# Patient Record
Sex: FEMALE | Race: ASIAN | NOT HISPANIC OR LATINO | URBAN - METROPOLITAN AREA
[De-identification: names, ages, dates, MRNs, and addresses within clinical notes are randomized per-mention and may not be internally consistent; named-entity substitution may affect disease eponyms.]

---

## 2023-04-01 ENCOUNTER — EMERGENCY (EMERGENCY)
Facility: HOSPITAL | Age: 28
LOS: 0 days | Discharge: HOME | End: 2023-04-02
Attending: EMERGENCY MEDICINE
Payer: OTHER MISCELLANEOUS

## 2023-04-01 VITALS
HEART RATE: 82 BPM | SYSTOLIC BLOOD PRESSURE: 120 MMHG | OXYGEN SATURATION: 100 % | TEMPERATURE: 99 F | RESPIRATION RATE: 16 BRPM | DIASTOLIC BLOOD PRESSURE: 94 MMHG
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
## 2023-04-01 DIAGNOSIS — Z77.21 CONTACT WITH AND (SUSPECTED) EXPOSURE TO POTENTIALLY HAZARDOUS BODY FLUIDS: ICD-10-CM
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
## 2023-04-01 DIAGNOSIS — Z23 ENCOUNTER FOR IMMUNIZATION: ICD-10-CM

## 2023-04-01 PROCEDURE — 85025 COMPLETE CBC W/AUTO DIFF WBC: CPT

## 2023-04-01 PROCEDURE — 86803 HEPATITIS C AB TEST: CPT

## 2023-04-01 PROCEDURE — 86706 HEP B SURFACE ANTIBODY: CPT

## 2023-04-01 PROCEDURE — 90715 TDAP VACCINE 7 YRS/> IM: CPT

## 2023-04-01 PROCEDURE — 99284 EMERGENCY DEPT VISIT MOD MDM: CPT

## 2023-04-01 PROCEDURE — 82248 BILIRUBIN DIRECT: CPT

## 2023-04-01 PROCEDURE — 87521 HEPATITIS C PROBE&RVRS TRNSC: CPT

## 2023-04-01 PROCEDURE — 84702 CHORIONIC GONADOTROPIN TEST: CPT

## 2023-04-01 PROCEDURE — 36415 COLL VENOUS BLD VENIPUNCTURE: CPT

## 2023-04-01 PROCEDURE — 90471 IMMUNIZATION ADMIN: CPT

## 2023-04-01 PROCEDURE — 87389 HIV-1 AG W/HIV-1&-2 AB AG IA: CPT

## 2023-04-01 PROCEDURE — 99053 MED SERV 10PM-8AM 24 HR FAC: CPT

## 2023-04-01 PROCEDURE — 99283 EMERGENCY DEPT VISIT LOW MDM: CPT | Mod: 25

## 2023-04-01 PROCEDURE — 87340 HEPATITIS B SURFACE AG IA: CPT

## 2023-04-01 PROCEDURE — 80053 COMPREHEN METABOLIC PANEL: CPT

## 2023-04-01 NOTE — ED ADULT TRIAGE NOTE - CHIEF COMPLAINT QUOTE
pt ambulatory to ED s/p finger stick. pt states she works at [employer], stuck her finger with a dirty needle. does not know status of patient.

## 2023-04-02 VITALS
RESPIRATION RATE: 19 BRPM | OXYGEN SATURATION: 99 % | DIASTOLIC BLOOD PRESSURE: 85 MMHG | SYSTOLIC BLOOD PRESSURE: 117 MMHG | HEART RATE: 78 BPM

## 2023-04-02 LAB
ALBUMIN SERPL ELPH-MCNC: 3.8 G/DL — SIGNIFICANT CHANGE UP (ref 3.3–5)
ALP SERPL-CCNC: 51 U/L — SIGNIFICANT CHANGE UP (ref 40–120)
ALT FLD-CCNC: 61 U/L — SIGNIFICANT CHANGE UP (ref 12–78)
ANION GAP SERPL CALC-SCNC: 4 MMOL/L — LOW (ref 5–17)
AST SERPL-CCNC: 26 U/L — SIGNIFICANT CHANGE UP (ref 15–37)
BASOPHILS # BLD AUTO: 0.03 K/UL — SIGNIFICANT CHANGE UP (ref 0–0.2)
BASOPHILS NFR BLD AUTO: 0.3 % — SIGNIFICANT CHANGE UP (ref 0–2)
BILIRUB DIRECT SERPL-MCNC: 0.1 MG/DL — SIGNIFICANT CHANGE UP (ref 0–0.3)
BILIRUB INDIRECT FLD-MCNC: 0.5 MG/DL — SIGNIFICANT CHANGE UP (ref 0.2–1)
BILIRUB SERPL-MCNC: 0.6 MG/DL — SIGNIFICANT CHANGE UP (ref 0.2–1.2)
BUN SERPL-MCNC: 12 MG/DL — SIGNIFICANT CHANGE UP (ref 7–23)
CALCIUM SERPL-MCNC: 9.1 MG/DL — SIGNIFICANT CHANGE UP (ref 8.5–10.1)
CHLORIDE SERPL-SCNC: 107 MMOL/L — SIGNIFICANT CHANGE UP (ref 96–108)
CO2 SERPL-SCNC: 27 MMOL/L — SIGNIFICANT CHANGE UP (ref 22–31)
CREAT SERPL-MCNC: 0.55 MG/DL — SIGNIFICANT CHANGE UP (ref 0.5–1.3)
EGFR: 127 ML/MIN/1.73M2 — SIGNIFICANT CHANGE UP
EOSINOPHIL # BLD AUTO: 0.09 K/UL — SIGNIFICANT CHANGE UP (ref 0–0.5)
EOSINOPHIL NFR BLD AUTO: 1 % — SIGNIFICANT CHANGE UP (ref 0–6)
GLUCOSE SERPL-MCNC: 91 MG/DL — SIGNIFICANT CHANGE UP (ref 70–99)
HBV SURFACE AB SER-ACNC: REACTIVE
HBV SURFACE AG SER-ACNC: SIGNIFICANT CHANGE UP
HCG SERPL-ACNC: <1 MIU/ML — SIGNIFICANT CHANGE UP
HCT VFR BLD CALC: 41.4 % — SIGNIFICANT CHANGE UP (ref 34.5–45)
HCV AB S/CO SERPL IA: 0.17 S/CO — SIGNIFICANT CHANGE UP (ref 0–0.99)
HCV AB SERPL-IMP: SIGNIFICANT CHANGE UP
HGB BLD-MCNC: 13.5 G/DL — SIGNIFICANT CHANGE UP (ref 11.5–15.5)
HIV 1+2 AB+HIV1 P24 AG SERPL QL IA: SIGNIFICANT CHANGE UP
IMM GRANULOCYTES NFR BLD AUTO: 0.2 % — SIGNIFICANT CHANGE UP (ref 0–0.9)
LYMPHOCYTES # BLD AUTO: 2.64 K/UL — SIGNIFICANT CHANGE UP (ref 1–3.3)
LYMPHOCYTES # BLD AUTO: 28.5 % — SIGNIFICANT CHANGE UP (ref 13–44)
MCHC RBC-ENTMCNC: 29.3 PG — SIGNIFICANT CHANGE UP (ref 27–34)
MCHC RBC-ENTMCNC: 32.6 GM/DL — SIGNIFICANT CHANGE UP (ref 32–36)
MCV RBC AUTO: 89.8 FL — SIGNIFICANT CHANGE UP (ref 80–100)
MONOCYTES # BLD AUTO: 0.62 K/UL — SIGNIFICANT CHANGE UP (ref 0–0.9)
MONOCYTES NFR BLD AUTO: 6.7 % — SIGNIFICANT CHANGE UP (ref 2–14)
NEUTROPHILS # BLD AUTO: 5.87 K/UL — SIGNIFICANT CHANGE UP (ref 1.8–7.4)
NEUTROPHILS NFR BLD AUTO: 63.3 % — SIGNIFICANT CHANGE UP (ref 43–77)
PLATELET # BLD AUTO: 232 K/UL — SIGNIFICANT CHANGE UP (ref 150–400)
POTASSIUM SERPL-MCNC: 4 MMOL/L — SIGNIFICANT CHANGE UP (ref 3.5–5.3)
POTASSIUM SERPL-SCNC: 4 MMOL/L — SIGNIFICANT CHANGE UP (ref 3.5–5.3)
PROT SERPL-MCNC: 7.8 GM/DL — SIGNIFICANT CHANGE UP (ref 6–8.3)
RBC # BLD: 4.61 M/UL — SIGNIFICANT CHANGE UP (ref 3.8–5.2)
RBC # FLD: 11.7 % — SIGNIFICANT CHANGE UP (ref 10.3–14.5)
SODIUM SERPL-SCNC: 138 MMOL/L — SIGNIFICANT CHANGE UP (ref 135–145)
WBC # BLD: 9.27 K/UL — SIGNIFICANT CHANGE UP (ref 3.8–10.5)
WBC # FLD AUTO: 9.27 K/UL — SIGNIFICANT CHANGE UP (ref 3.8–10.5)

## 2023-04-02 RX ORDER — TETANUS TOXOID, REDUCED DIPHTHERIA TOXOID AND ACELLULAR PERTUSSIS VACCINE, ADSORBED 5; 2.5; 8; 8; 2.5 [IU]/.5ML; [IU]/.5ML; UG/.5ML; UG/.5ML; UG/.5ML
0.5 SUSPENSION INTRAMUSCULAR ONCE
Refills: 0 | Status: COMPLETED | OUTPATIENT
Start: 2023-04-02 | End: 2023-04-02

## 2023-04-02 RX ADMIN — TETANUS TOXOID, REDUCED DIPHTHERIA TOXOID AND ACELLULAR PERTUSSIS VACCINE, ADSORBED 0.5 MILLILITER(S): 5; 2.5; 8; 8; 2.5 SUSPENSION INTRAMUSCULAR at 01:43

## 2023-04-02 NOTE — ED PROVIDER NOTE - PATIENT PORTAL LINK FT
You can access the FollowMyHealth Patient Portal offered by [facility] by registering at the following website: [email or web address]. By joining [facility]s FollowMyHealth portal, you will also be able to view your health information using other applications (apps) compatible with our system.

## 2023-04-02 NOTE — ED ADULT NURSE NOTE - SUICIDE SCREENING QUESTION 3
Quality 110: Preventive Care And Screening: Influenza Immunization: Influenza Immunization previously received during influenza season Quality 431: Preventive Care And Screening: Unhealthy Alcohol Use - Screening: Patient not identified as an unhealthy alcohol user when screened for unhealthy alcohol use using a systematic screening method Quality 226: Preventive Care And Screening: Tobacco Use: Screening And Cessation Intervention: Patient screened for tobacco use and is an ex/non-smoker Quality 130: Documentation Of Current Medications In The Medical Record: Current Medications Documented Detail Level: Detailed No

## 2023-04-02 NOTE — ED PROVIDER NOTE - NSFOLLOWUPINSTRUCTIONS_ED_ALL_ED_FT
Needlestick and Sharps Injury  follow up with your supervisor tomorrow  A needlestick injury happens when you get poked by a needle or sharp tool (sharps) that may have someone else's blood on it. You may get that person's blood or body fluids on you. Blood can carry germs that cause infection, such as:  Hepatitis B germs.  Hepatitis C germs.  HIV (human immunodeficiency virus).  What are the causes?  This injury is caused by a needle or other sharp tool that breaks through or cuts your skin. The injury can happen to people who do these things:  Give someone a shot (injection).  Take a blood sample from someone.  Do medical procedures with a needle or sharp knife used for surgery (scalpel).  Handle or throw away used needles.  Clean equipment or patient care areas.  Touch a needle that was thrown out.  Share needles or use them to give themselves drugs.  What increases the risk?  Putting caps back on needles.  Passing sharp objects to someone else.  Not using safety tools with needles.  Not having safety tools to use with needles.  Feeling like you have to move or work fast when using needles or sharps at work.  What are the signs or symptoms?  Pain or irritation at or near the wound.  Bleeding at or near the wound.  How is this treated?  A person having a blood sample taken from arm.  Take these steps if you get poked by a needle or something sharp, or if you think you got blood or body fluids on you:  Wash your wound right away with soap and water. Wash for at least 20 seconds.  Place a bandage (dressing) or clean towel on your wound and put gentle pressure on it.  Do not squeeze or rub your wound.  Tell a workplace supervisor or doctor right away. Then:  Follow any procedures in your workplace, if they apply.  Get treatment right away, if you need it.  Keep your shots (vaccines) up to date, if you are an adult.  Treatment may include:  A tetanus booster shot.  A hepatitis B shot.  Blood tests.  Medicines.  Wound care.  Follow these instructions at home:  Wound care    Follow instructions from your doctor about how to take care of your wound. Make sure you:  Wash your hands with soap and water for at least 20 seconds before and after you change your bandage, if you have one. If you cannot use soap and water, use hand .  Change your bandage.  Leavestitches or skin glue in place for at least 2 weeks.  Leave tape strips alone unless you are told to take them off. You may trim the edges of the tape strips if they curl up.  Keep the bandage dry as told by your doctor.  Do not take baths, swim, or use a hot tub. Do not do anything that would put your wound underwater until told.  Check your wound every day for signs of infection. Check for:  Redness, swelling, or pain.  Fluid or blood.  Warmth.  Pus or a bad smell.  General instructions    Take over-the-counter and prescription medicines only as told by your doctor.  If you were prescribed an antibiotic medicine, take it as told by your doctor. Do not stop using the antibiotic even if you start to feel better.  Keep all follow-up visits.  Contact a doctor if:  The poked area is red, swollen, bleeding, or painful.  You have a fever, or you feel tired.  You feel any of these:  Worried or nervous (anxious).  Mad.    A needlestick injury happens when a person gets poked by a sharp tool that may have someone else's blood on it.  This injury is treated by washing the injured area right away with soap and water for at least 20 seconds.  You may get shots for tetanus and hepatitis B. You may also get medicines.  This information is not intended to replace advice given to you by your health care provider. Make sure you discuss any questions you have with your health care provider.    Document Revised: 12/20/2021 Document Reviewed: 11/09/2021

## 2023-04-02 NOTE — ED PROVIDER NOTE - CONTACT TIME
ASSESSMENT/PLAN:       1. Encounter to establish care    2. Weight gain  symptomatic  - TSH with free T4 reflex  - Basic metabolic panel  - Lipid Profile    3. Midline low back pain without sciatica, unspecified chronicity  Continue ibuprofen or naproxen as needed  - baclofen (LIORESAL) 10 MG tablet; Take 0.5-1 tablets (5-10 mg) by mouth 3 times daily  Dispense: 90 tablet; Refill: 1    4. Need for hepatitis C screening test  routine  - Hepatitis C Screen Reflex to HCV RNA Quant and Genotype    Declined flu vaccine  Updated boostrix daily    FUTURE APPOINTMENTS:       - Follow-up visit as needed    Deanna Shine, NP  [facility]      
02-Apr-2023 01:09

## 2023-04-02 NOTE — ED PROVIDER NOTE - CLINICAL SUMMARY MEDICAL DECISION MAKING FREE TEXT BOX
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 source pt is known    spoke with Nursing supervisor at [employer] Cherelle Ham RN, states [employer] will notify source pt and their family that they could get blood drawn  and then coordinate if pt needs medications. Pt is notknown to have HIV, therefor will not give prophylaxis prep, nurse to follow up at work tomorrow

## 2023-04-02 NOTE — ED PROVIDER NOTE - OBJECTIVE STATEMENT
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 Pt is known. He supervisor sent her to [facility] to get needlestick labs. Pt does not know her vaccination history. Spoke with Nursing supervisor Cherelle Ham from [facility],  to follow up with nursing supervision tomorrow

## 2023-04-02 NOTE — ED ADULT NURSE NOTE - CHIEF COMPLAINT QUOTE
pt ambulatory to ED s/p finger stick. pt states she works at [email or web address]), stuck her finger with a dirty needle. does not know status of patient.

## 2023-04-02 NOTE — ED ADULT NURSE NOTE - OBJECTIVE STATEMENT
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 No active bleeding to site, pt denies any pain or discomfort, evaluated by provider.

## 2023-04-03 LAB
HCV RNA FLD QL NAA+PROBE: SIGNIFICANT CHANGE UP
HCV RNA SPEC QL PROBE+SIG AMP: SIGNIFICANT CHANGE UP